# Patient Record
Sex: MALE | Race: WHITE | Employment: OTHER | URBAN - NONMETROPOLITAN AREA
[De-identification: names, ages, dates, MRNs, and addresses within clinical notes are randomized per-mention and may not be internally consistent; named-entity substitution may affect disease eponyms.]

---

## 2017-07-10 ENCOUNTER — HOSPITAL ENCOUNTER (OUTPATIENT)
Age: 82
Discharge: HOME | End: 2017-07-10
Payer: MEDICARE

## 2017-07-10 DIAGNOSIS — C82.80 OTHER TYPE OF FOLLICULAR LYMPHOMA: ICD-10-CM

## 2017-07-10 LAB
ABSOLUTE EOS #: 0.1 K/UL (ref 0–0.4)
ABSOLUTE LYMPH #: 1.7 K/UL (ref 0.9–2.5)
ABSOLUTE MONO #: 0.6 K/UL (ref 0–1)
ALBUMIN SERPL-MCNC: 4.3 G/DL (ref 3.5–5.2)
ALBUMIN/GLOBULIN RATIO: ABNORMAL (ref 1–2.5)
ALP BLD-CCNC: 86 U/L (ref 40–129)
ALT SERPL-CCNC: 15 U/L (ref 5–41)
ANION GAP SERPL CALCULATED.3IONS-SCNC: 15 MMOL/L (ref 9–17)
AST SERPL-CCNC: 23 U/L
BASOPHILS # BLD: 1 %
BASOPHILS ABSOLUTE: 0 K/UL (ref 0–0.2)
BILIRUB SERPL-MCNC: 0.65 MG/DL (ref 0.3–1.2)
BUN BLDV-MCNC: 17 MG/DL (ref 8–23)
BUN/CREAT BLD: 18 (ref 9–20)
CALCIUM SERPL-MCNC: 9.5 MG/DL (ref 8.6–10.4)
CHLORIDE BLD-SCNC: 102 MMOL/L (ref 98–107)
CO2: 24 MMOL/L (ref 20–31)
CREAT SERPL-MCNC: 0.94 MG/DL (ref 0.7–1.2)
DIFFERENTIAL TYPE: YES
EOSINOPHILS RELATIVE PERCENT: 2 %
GFR AFRICAN AMERICAN: >60 ML/MIN
GFR NON-AFRICAN AMERICAN: >60 ML/MIN
GFR SERPL CREATININE-BSD FRML MDRD: ABNORMAL ML/MIN/{1.73_M2}
GFR SERPL CREATININE-BSD FRML MDRD: ABNORMAL ML/MIN/{1.73_M2}
GLUCOSE BLD-MCNC: 115 MG/DL (ref 70–99)
HCT VFR BLD CALC: 46.5 % (ref 41–53)
HEMOGLOBIN: 15.5 G/DL (ref 13.5–17.5)
LACTATE DEHYDROGENASE: 178 U/L (ref 135–225)
LYMPHOCYTES # BLD: 30 %
MCH RBC QN AUTO: 34.7 PG (ref 26–34)
MCHC RBC AUTO-ENTMCNC: 33.3 G/DL (ref 31–37)
MCV RBC AUTO: 104.2 FL (ref 80–100)
MONOCYTES # BLD: 10 %
PDW BLD-RTO: 14.2 % (ref 12.1–15.2)
PLATELET # BLD: 109 K/UL (ref 140–450)
PLATELET ESTIMATE: ABNORMAL
PMV BLD AUTO: ABNORMAL FL (ref 6–12)
POTASSIUM SERPL-SCNC: 4.1 MMOL/L (ref 3.7–5.3)
RBC # BLD: 4.46 M/UL (ref 4.5–5.9)
RBC # BLD: ABNORMAL 10*6/UL
SEG NEUTROPHILS: 57 %
SEGMENTED NEUTROPHILS ABSOLUTE COUNT: 3.4 K/UL (ref 2.1–6.5)
SODIUM BLD-SCNC: 141 MMOL/L (ref 135–144)
TOTAL PROTEIN: 6.5 G/DL (ref 6.4–8.3)
URIC ACID: 8.1 MG/DL (ref 3.4–7)
WBC # BLD: 5.8 K/UL (ref 3.5–11)
WBC # BLD: ABNORMAL 10*3/UL

## 2017-07-10 PROCEDURE — 36415 COLL VENOUS BLD VENIPUNCTURE: CPT

## 2017-07-10 PROCEDURE — 83615 LACTATE (LD) (LDH) ENZYME: CPT

## 2017-07-10 PROCEDURE — 85025 COMPLETE CBC W/AUTO DIFF WBC: CPT

## 2017-07-10 PROCEDURE — 80053 COMPREHEN METABOLIC PANEL: CPT

## 2017-07-10 PROCEDURE — 84550 ASSAY OF BLOOD/URIC ACID: CPT

## 2017-07-13 ENCOUNTER — OFFICE VISIT (OUTPATIENT)
Dept: FAMILY MEDICINE CLINIC | Age: 82
End: 2017-07-13
Payer: MEDICARE

## 2017-07-13 VITALS
WEIGHT: 230 LBS | HEIGHT: 68 IN | BODY MASS INDEX: 34.86 KG/M2 | SYSTOLIC BLOOD PRESSURE: 136 MMHG | DIASTOLIC BLOOD PRESSURE: 70 MMHG

## 2017-07-13 DIAGNOSIS — I10 ESSENTIAL HYPERTENSION: Primary | ICD-10-CM

## 2017-07-13 DIAGNOSIS — E78.00 PURE HYPERCHOLESTEROLEMIA: ICD-10-CM

## 2017-07-13 DIAGNOSIS — M1A.09X0 IDIOPATHIC CHRONIC GOUT OF MULTIPLE SITES WITHOUT TOPHUS: ICD-10-CM

## 2017-07-13 PROCEDURE — 1036F TOBACCO NON-USER: CPT | Performed by: FAMILY MEDICINE

## 2017-07-13 PROCEDURE — 4040F PNEUMOC VAC/ADMIN/RCVD: CPT | Performed by: FAMILY MEDICINE

## 2017-07-13 PROCEDURE — 1123F ACP DISCUSS/DSCN MKR DOCD: CPT | Performed by: FAMILY MEDICINE

## 2017-07-13 PROCEDURE — G8427 DOCREV CUR MEDS BY ELIG CLIN: HCPCS | Performed by: FAMILY MEDICINE

## 2017-07-13 PROCEDURE — G8419 CALC BMI OUT NRM PARAM NOF/U: HCPCS | Performed by: FAMILY MEDICINE

## 2017-07-13 PROCEDURE — 99214 OFFICE O/P EST MOD 30 MIN: CPT | Performed by: FAMILY MEDICINE

## 2017-07-13 RX ORDER — INDOMETHACIN 50 MG/1
50 CAPSULE ORAL
Qty: 90 CAPSULE | Refills: 1 | Status: SHIPPED | OUTPATIENT
Start: 2017-07-13 | End: 2018-08-02 | Stop reason: SDUPTHER

## 2017-07-13 RX ORDER — LOVASTATIN 20 MG/1
20 TABLET ORAL NIGHTLY
Qty: 90 TABLET | Refills: 3 | Status: SHIPPED | OUTPATIENT
Start: 2017-07-13 | End: 2018-08-02 | Stop reason: SDUPTHER

## 2017-07-13 RX ORDER — METOPROLOL TARTRATE 50 MG/1
50 TABLET, FILM COATED ORAL 2 TIMES DAILY
Qty: 180 TABLET | Refills: 3 | Status: SHIPPED | OUTPATIENT
Start: 2017-07-13 | End: 2018-08-02 | Stop reason: SDUPTHER

## 2017-07-13 RX ORDER — AMLODIPINE BESYLATE 5 MG/1
5 TABLET ORAL DAILY
Qty: 90 TABLET | Refills: 3 | Status: SHIPPED | OUTPATIENT
Start: 2017-07-13 | End: 2018-08-02 | Stop reason: SDUPTHER

## 2017-07-13 RX ORDER — FUROSEMIDE 20 MG/1
20 TABLET ORAL DAILY
Qty: 90 TABLET | Refills: 3 | Status: SHIPPED | OUTPATIENT
Start: 2017-07-13 | End: 2018-08-02 | Stop reason: SDUPTHER

## 2017-07-13 ASSESSMENT — ENCOUNTER SYMPTOMS
CONSTIPATION: 0
SHORTNESS OF BREATH: 0
COUGH: 0
FACIAL SWELLING: 0
TROUBLE SWALLOWING: 0
EYE REDNESS: 0
DIARRHEA: 0
VOMITING: 0
BLOOD IN STOOL: 0
NAUSEA: 0
ABDOMINAL PAIN: 0
BLURRED VISION: 0
EYE DISCHARGE: 0

## 2017-07-19 ENCOUNTER — OFFICE VISIT (OUTPATIENT)
Dept: ONCOLOGY | Age: 82
End: 2017-07-19
Payer: MEDICARE

## 2017-07-19 VITALS
HEIGHT: 68 IN | DIASTOLIC BLOOD PRESSURE: 73 MMHG | WEIGHT: 227.4 LBS | BODY MASS INDEX: 34.46 KG/M2 | TEMPERATURE: 97.2 F | SYSTOLIC BLOOD PRESSURE: 134 MMHG | HEART RATE: 70 BPM | RESPIRATION RATE: 16 BRPM

## 2017-07-19 DIAGNOSIS — C82.80 OTHER TYPE OF FOLLICULAR LYMPHOMA: Primary | ICD-10-CM

## 2017-07-19 PROCEDURE — G8427 DOCREV CUR MEDS BY ELIG CLIN: HCPCS | Performed by: INTERNAL MEDICINE

## 2017-07-19 PROCEDURE — 99214 OFFICE O/P EST MOD 30 MIN: CPT | Performed by: INTERNAL MEDICINE

## 2017-07-19 PROCEDURE — 1036F TOBACCO NON-USER: CPT | Performed by: INTERNAL MEDICINE

## 2017-07-19 PROCEDURE — 1123F ACP DISCUSS/DSCN MKR DOCD: CPT | Performed by: INTERNAL MEDICINE

## 2017-07-19 PROCEDURE — 4040F PNEUMOC VAC/ADMIN/RCVD: CPT | Performed by: INTERNAL MEDICINE

## 2017-07-19 PROCEDURE — G8417 CALC BMI ABV UP PARAM F/U: HCPCS | Performed by: INTERNAL MEDICINE

## 2018-04-11 LAB
BUN BLDV-MCNC: 16 MG/DL
CALCIUM SERPL-MCNC: 9.3 MG/DL
CHLORIDE BLD-SCNC: 103 MMOL/L
CO2: 28 MMOL/L
CREAT SERPL-MCNC: 1 MG/DL
GFR CALCULATED: NORMAL
GLUCOSE BLD-MCNC: 100 MG/DL
POTASSIUM SERPL-SCNC: 4.2 MMOL/L
SODIUM BLD-SCNC: 144 MMOL/L

## 2018-07-25 ENCOUNTER — OFFICE VISIT (OUTPATIENT)
Dept: ONCOLOGY | Age: 83
End: 2018-07-25
Payer: MEDICARE

## 2018-07-25 VITALS
SYSTOLIC BLOOD PRESSURE: 144 MMHG | TEMPERATURE: 98.6 F | WEIGHT: 224 LBS | DIASTOLIC BLOOD PRESSURE: 75 MMHG | HEIGHT: 68 IN | BODY MASS INDEX: 33.95 KG/M2 | RESPIRATION RATE: 18 BRPM | HEART RATE: 69 BPM

## 2018-07-25 DIAGNOSIS — C82.88 OTHER TYPE OF FOLLICULAR LYMPHOMA OF LYMPH NODES OF MULTIPLE REGIONS (HCC): Primary | ICD-10-CM

## 2018-07-25 PROCEDURE — 1036F TOBACCO NON-USER: CPT | Performed by: INTERNAL MEDICINE

## 2018-07-25 PROCEDURE — 1101F PT FALLS ASSESS-DOCD LE1/YR: CPT | Performed by: INTERNAL MEDICINE

## 2018-07-25 PROCEDURE — 1123F ACP DISCUSS/DSCN MKR DOCD: CPT | Performed by: INTERNAL MEDICINE

## 2018-07-25 PROCEDURE — G8417 CALC BMI ABV UP PARAM F/U: HCPCS | Performed by: INTERNAL MEDICINE

## 2018-07-25 PROCEDURE — 4040F PNEUMOC VAC/ADMIN/RCVD: CPT | Performed by: INTERNAL MEDICINE

## 2018-07-25 PROCEDURE — G8428 CUR MEDS NOT DOCUMENT: HCPCS | Performed by: INTERNAL MEDICINE

## 2018-07-25 PROCEDURE — 99214 OFFICE O/P EST MOD 30 MIN: CPT | Performed by: INTERNAL MEDICINE

## 2018-07-25 NOTE — PROGRESS NOTES
Reason for the visit:   Chief Complaint   Patient presents with    Lymphoma       Pertinent Clinical Problems/ Treatments:  1. Stage IV follicular lymphoma diagnosed in 2012, treated by 6 cycles of R CHOP followed by maintenance rituximab started February 2015  2. Patient goes back and forth between [facility]  3. Evidence of progression in the mediastinum as of February 2015,s/p radiation therapy to the mediastinum ended May 20, 2015  4. had radiation treatment in Feb 2016 in [place] to ureter area. Stent removed. Summary of the case/HPI :    51-year-old patient who was diagnosed in 2012 with a stage IV follicular lymphoma involving mediastinum, retroperitoneal adenopathy in the bone marrow, he had bulky adenopathy and was treated with 6 cycles of R CHOP that ended in November 2012. He is achieved partial response and he was put on maintenance rituximab that ended in February 2015. Also he developed pleural effusion during initial presentation  Patient goes back and forth from [facility] and [place] and he just came back from their  In February 2015 he was found to have large mediastinal mass about 2 inches, patient was a treated with radiation therapy to the mediastinum, PET scan was done and showed activity only in the chest.  He completed radiation around 27 May 2015. Patient mentioned that he didn't have  symptoms like shortness of breath or coughing,    INTERIM HISTORY:  The patient is being observed. He spends his time between [place] and [place]. He feels well, no nausea or vomiting. No fever or chills or weight loss. No lymphadenopathy. Past Medical History   has a past medical history of Gout; Hx of colonoscopy; Hyperlipidemia; Lymphoma ([address] Osteoarthritis; and Personal history of colonic polyps. Surgical History   has a past surgical history that includes Eye surgery; Hydrocele surgery (2011); Cataract removal; Abdominal aortic aneurysm repair (12/12);  Total knee arthroplasty (Right); Ureter stent placement (4/15); and Colonoscopy. Family History  Family History   Problem Relation Age of Onset    Cancer Mother         multiple meyloma    Heart Disease Father        Home Medications  has a current medication list which includes the following prescription(s): metoprolol tartrate, amlodipine, lovastatin, furosemide, indomethacin, aspirin, rutin, misc natural products, vitamin d, and multivitamin, and the following Facility-Administered Medications: sodium chloride and sodium chloride (pf). Allergies:  Patient has no known allergies. Review of Systems :    Constitutional: No fever or chills. No night sweats, no weight loss   Eyes: No eye discharge, double vision, or eye pain   HEENT: negative for sore mouth, sore throat, hoarseness and voice change   Respiratory: negative for cough , sputum, dyspnea, wheezing, hemoptysis, chest pain   Cardiovascular: negative for chest pain, dyspnea, palpitations, orthopnea, PND   Gastrointestinal: negative for nausea, vomiting, diarrhea, constipation, abdominal pain, Dysphagia, hematemesis and hematochezia   Genitourinary: negative for frequency, dysuria, nocturia, urinary incontinence, and hematuria   Integument: negative for rash, skin lesions, bruises.    Hematologic/Lymphatic: negative for easy bruising, bleeding, lymphadenopathy, petechiae and swelling/edema   Endocrine: negative for heat or cold intolerance, tremor, weight changes, change in bowel habits and hair loss   Musculoskeletal: negative for myalgias, arthralgias, pain, joint swelling,and bone pain   Neurological: negative for headaches, dizziness, seizures, weakness, numbness      Physical Examination :       BP (!) 144/75 (Site: Right Arm, Position: Sitting, Cuff Size: Large Adult)   Pulse 69   Temp 98.6 °F (37 °C) (Temporal)   Resp 18   Ht 5' 8\" (1.727 m)   Wt 224 lb (101.6 kg)   BMI 34.06 kg/m²     Performance Status:Estimated performance status is ECOG 1    General appearance - well appearing, no in pain or distress   Mental status - alert and cooperative   Eyes - pupils equal and reactive, extraocular eye movements intact   Ears - bilateral TM's and external ear canals normal   Mouth - mucous membranes moist, pharynx normal without lesions   Neck - supple, no significant adenopathy ,trach is central   Lymphatics - no palpable lymphadenopathy, no hepatosplenomegaly   Chest - clear to auscultation, no wheezes, rales or rhonchi, symmetric air entry   Heart - normal rate, regular rhythm, normal S1, S2, no murmurs, rubs, clicks or gallops   Abdomen - soft, nontender, nondistended, no masses or organomegaly   Neurological - alert, oriented, normal speech, no focal findings or movement disorder noted   Musculoskeletal - no joint tenderness, deformity or swelling   Extremities - peripheral pulses normal, no pedal edema, no clubbing or cyanosis   Skin - normal coloration and turgor, no rashes, no suspicious skin lesions noted          Lab Results   Component Value Date     07/10/2017    K 4.1 07/10/2017     07/10/2017    CO2 24 07/10/2017    BUN 17 07/10/2017    CREATININE 0.94 07/10/2017    GLUCOSE 115 07/10/2017    CALCIUM 9.5 07/10/2017     Lab Results   Component Value Date    WBC 5.8 07/10/2017    HGB 15.5 07/10/2017    HCT 46.5 07/10/2017    .2 07/10/2017     07/10/2017       PET scan is being compared. Concerned about progression in the stent area. Aortic aneurysm is large. Likely stable     PET/CT scan in [place] April 2018: negative. CBC, CMP, LDH April 2018 normal      Assessment:      1. Stage IV follicular lymphoma with adenopathy in the abdomen and chest diagnosed in 2012, treated by 6 cycles of R CHOP that was followed by maintenance rituximab which ended February 2015 when he was found to have progression. Response initially was partial  2.  Evidence of progression in the mediastinum as of February 2015, treated with mediastinal radiation that concluded in end of May 2015. 3. Currently patient is in remission. Plan:   1. He has been off rituximab since February 2015  2. S/p radiation to the mediastinum  3. Status post radiation to around the left ureter area February 2016. 4. PET/CT scan and labs April 2018 in [place] are normal. No evidence of progression. 5. He states his AAA was treated by endovascular surgery. 6. We will follow when he is here, as he spends most of his time in [place]. 7. RV one year.                               [address] [place] [address] [facility]                          Cell: [phone]

## 2018-08-02 ENCOUNTER — OFFICE VISIT (OUTPATIENT)
Dept: FAMILY MEDICINE CLINIC | Age: 83
End: 2018-08-02
Payer: MEDICARE

## 2018-08-02 VITALS
HEART RATE: 80 BPM | OXYGEN SATURATION: 96 % | DIASTOLIC BLOOD PRESSURE: 64 MMHG | SYSTOLIC BLOOD PRESSURE: 120 MMHG | WEIGHT: 222 LBS | BODY MASS INDEX: 33.75 KG/M2

## 2018-08-02 DIAGNOSIS — M1A.09X0 IDIOPATHIC CHRONIC GOUT OF MULTIPLE SITES WITHOUT TOPHUS: ICD-10-CM

## 2018-08-02 DIAGNOSIS — I10 ESSENTIAL HYPERTENSION: ICD-10-CM

## 2018-08-02 DIAGNOSIS — E78.00 PURE HYPERCHOLESTEROLEMIA: ICD-10-CM

## 2018-08-02 PROCEDURE — G8417 CALC BMI ABV UP PARAM F/U: HCPCS | Performed by: FAMILY MEDICINE

## 2018-08-02 PROCEDURE — G8427 DOCREV CUR MEDS BY ELIG CLIN: HCPCS | Performed by: FAMILY MEDICINE

## 2018-08-02 PROCEDURE — 1101F PT FALLS ASSESS-DOCD LE1/YR: CPT | Performed by: FAMILY MEDICINE

## 2018-08-02 PROCEDURE — 1036F TOBACCO NON-USER: CPT | Performed by: FAMILY MEDICINE

## 2018-08-02 PROCEDURE — 4040F PNEUMOC VAC/ADMIN/RCVD: CPT | Performed by: FAMILY MEDICINE

## 2018-08-02 PROCEDURE — 1123F ACP DISCUSS/DSCN MKR DOCD: CPT | Performed by: FAMILY MEDICINE

## 2018-08-02 PROCEDURE — 99213 OFFICE O/P EST LOW 20 MIN: CPT | Performed by: FAMILY MEDICINE

## 2018-08-02 RX ORDER — METOPROLOL TARTRATE 50 MG/1
50 TABLET, FILM COATED ORAL 2 TIMES DAILY
Qty: 180 TABLET | Refills: 3 | Status: SHIPPED | OUTPATIENT
Start: 2018-08-02

## 2018-08-02 RX ORDER — LOVASTATIN 20 MG/1
20 TABLET ORAL NIGHTLY
Qty: 90 TABLET | Refills: 3 | Status: SHIPPED | OUTPATIENT
Start: 2018-08-02

## 2018-08-02 RX ORDER — INDOMETHACIN 50 MG/1
50 CAPSULE ORAL
Qty: 90 CAPSULE | Refills: 1 | Status: SHIPPED | OUTPATIENT
Start: 2018-08-02

## 2018-08-02 RX ORDER — AMLODIPINE BESYLATE 5 MG/1
5 TABLET ORAL DAILY
Qty: 90 TABLET | Refills: 3 | Status: SHIPPED | OUTPATIENT
Start: 2018-08-02

## 2018-08-02 RX ORDER — FUROSEMIDE 20 MG/1
20 TABLET ORAL DAILY
Qty: 90 TABLET | Refills: 3 | Status: SHIPPED | OUTPATIENT
Start: 2018-08-02

## 2018-08-02 ASSESSMENT — ENCOUNTER SYMPTOMS
SHORTNESS OF BREATH: 0
FACIAL SWELLING: 0
DIARRHEA: 0
EYE REDNESS: 0
COUGH: 0
CONSTIPATION: 0
BLOOD IN STOOL: 0
EYE DISCHARGE: 0
TROUBLE SWALLOWING: 0
NAUSEA: 0
ABDOMINAL PAIN: 0
VOMITING: 0

## 2018-08-02 ASSESSMENT — PATIENT HEALTH QUESTIONNAIRE - PHQ9
SUM OF ALL RESPONSES TO PHQ QUESTIONS 1-9: 0
SUM OF ALL RESPONSES TO PHQ9 QUESTIONS 1 & 2: 0
1. LITTLE INTEREST OR PLEASURE IN DOING THINGS: 0
2. FEELING DOWN, DEPRESSED OR HOPELESS: 0

## 2018-08-02 NOTE — PROGRESS NOTES
HPI Notes    Name: Shayan Arevalo  : 1935         Chief Complaint:     Chief Complaint   Patient presents with    Hypertension    Hyperlipidemia    Gout       History of Present Illness:      Shayan Arevalo is a 82 y.o.  male who presents with Hypertension; Hyperlipidemia; and Gout      Hypertension   This is a chronic problem. The current episode started more than 1 year ago. The problem is unchanged. Pertinent negatives include no chest pain, headaches, palpitations or shortness of breath. There are no associated agents to hypertension. There are no known risk factors for coronary artery disease. The current treatment provides significant improvement. There are no compliance problems. Hyperlipidemia   This is a chronic problem. The current episode started more than 1 year ago. The problem is controlled. Recent lipid tests were reviewed and are normal. He has no history of diabetes or hypothyroidism. Pertinent negatives include no chest pain or shortness of breath. Current antihyperlipidemic treatment includes statins. The current treatment provides significant improvement of lipids. Risk factors for coronary artery disease include dyslipidemia, hypertension and male sex. GOut -chronic but stable. Pt will have occ \"flare up\" in toes or ankles and occ in hands. Pt states he takes the Indocin as needed for the \"flare up\" and it takes care of it.    Past Medical History:     Past Medical History:   Diagnosis Date    Gout     Hx of colonoscopy     Hyperlipidemia     [address] [address]     Osteoarthritis     Personal history of colonic polyps       Reviewed all health maintenance requirements and ordered appropriate tests  Health Maintenance Due   Topic Date Due    DTaP/Tdap/Td vaccine (1 - Tdap) 1956    Shingles Vaccine (1 of 2 - 2 Dose Series) 1987    Potassium monitoring  07/10/2018    Creatinine monitoring  07/10/2018       Past Surgical History:     Past Surgical History: Procedure Laterality Date    ABDOMINAL AORTIC ANEURYSM REPAIR  12/12    endography    CATARACT REMOVAL      COLONOSCOPY      EYE SURGERY      implants bilateral    HYDROCELE EXCISION  2011    rt testicle    TOTAL KNEE ARTHROPLASTY Right     URETER STENT PLACEMENT  4/15        Medications:       Prior to Admission medications    Medication Sig Start Date End Date Taking? Authorizing Provider   mupirocin (BACTROBAN) 2 % nasal ointment by Nasal route Take by Nasal route one time weekly   Yes Historical Provider, MD   metoprolol tartrate (LOPRESSOR) 50 MG tablet Take 1 tablet by mouth 2 times daily 8/2/18  Yes Padmini Wood MD   amLODIPine (NORVASC) 5 MG tablet Take 1 tablet by mouth daily 8/2/18  Yes Padmini Wood MD   lovastatin (MEVACOR) 20 MG tablet Take 1 tablet by mouth nightly 8/2/18  Yes Padmini Wood MD   furosemide (LASIX) 20 MG tablet Take 1 tablet by mouth daily 8/2/18  Yes Padmini Wood MD   indomethacin (INDOCIN) 50 MG capsule Take 1 capsule by mouth 3 times daily (with meals) 8/2/18  Yes Padmini Wood MD   aspirin 81 MG tablet Take 81 mg by mouth daily   Yes Historical Provider, MD   RUTIN PO Take 500 mg by mouth daily. Yes Historical Provider, MD   Misc Natural Products (OSTEO BI-FLEX ADV DOUBLE ST PO)   Take 1 tablet by mouth 2 times daily    Yes Historical Provider, MD   vitamin D (CHOLECALCIFEROL) 400 UNITS TABS tablet Take 1,000 Units by mouth daily. Yes Historical Provider, MD        Allergies:       Patient has no known allergies. Social History:     Tobacco:    reports that he quit smoking about 23 years ago. His smoking use included Cigarettes. He started smoking about 64 years ago. He has a 60.00 pack-year smoking history. He has never used smokeless tobacco.  Alcohol:      reports that he drinks alcohol. Drug Use:  has no drug history on file.     Family History:     Family History   Problem Relation Age of Onset    Cancer Mother         multiple meyloma    Refills    metoprolol tartrate (LOPRESSOR) 50 MG tablet 180 tablet 3     Sig: Take 1 tablet by mouth 2 times daily    amLODIPine (NORVASC) 5 MG tablet 90 tablet 3     Sig: Take 1 tablet by mouth daily    lovastatin (MEVACOR) 20 MG tablet 90 tablet 3     Sig: Take 1 tablet by mouth nightly    furosemide (LASIX) 20 MG tablet 90 tablet 3     Sig: Take 1 tablet by mouth daily    indomethacin (INDOCIN) 50 MG capsule 90 capsule 1     Sig: Take 1 capsule by mouth 3 times daily (with meals)       All patient questions answered. Patient voiced understanding. Quality Measures    Body mass index is 33.75 kg/m². Elevated. Weight control planned discussed Healthy diet and regular exercise. BP: 120/64. Blood pressure is normal. Treatment plan consists of No treatment change needed. Fall Risk 8/2/2018 8/31/2016 7/30/2014   2 or more falls in past year? no no no   Fall with injury in past year? no no no     The patient does not have a history of falls. I did not - not indicated , complete a risk assessment for falls. A plan of care for falls No Treatment plan indicated    Lab Results   Component Value Date    LDLCHOLESTEROL 72 08/27/2014    (goal LDL reduction with dx if diabetes is 50% LDL reduction)    PHQ Scores 8/2/2018 8/31/2016 7/30/2014   PHQ2 Score 0 0 0   PHQ9 Score 0 0 0     Interpretation of Total Score Depression Severity: 1-4 = Minimal depression, 5-9 = Mild depression, 10-14 = Moderate depression, 15-19 = Moderately severe depression, 20-27 = Severe depression        Return in about 1 year (around 8/2/2019) for HTN, Hyperlipidemia, gout.       Electronically signed by Micheline Varner MD on 8/2/2018 at 4:18 PM

## 2018-08-02 NOTE — PATIENT INSTRUCTIONS
SURVEY:    You may be receiving a survey from [employer] regarding your visit today. Please complete the survey to enable us to provide the highest quality of care to you and your family. If you cannot score us a very good on any question, please call the office to discuss how we could have made your experience a very good one. Thank you.

## 2018-08-16 ENCOUNTER — TELEPHONE (OUTPATIENT)
Dept: FAMILY MEDICINE CLINIC | Age: 83
End: 2018-08-16